# Patient Record
Sex: FEMALE | Race: ASIAN | Employment: UNEMPLOYED | URBAN - METROPOLITAN AREA
[De-identification: names, ages, dates, MRNs, and addresses within clinical notes are randomized per-mention and may not be internally consistent; named-entity substitution may affect disease eponyms.]

---

## 2017-05-09 PROBLEM — F32.2 SEVERE MAJOR DEPRESSION, SINGLE EPISODE, WITHOUT PSYCHOTIC FEATURES (HCC): Status: ACTIVE | Noted: 2017-05-09

## 2017-05-09 PROBLEM — F41.0 PANIC DISORDER WITHOUT AGORAPHOBIA: Status: ACTIVE | Noted: 2017-05-09

## 2017-12-02 ENCOUNTER — OFFICE VISIT (OUTPATIENT)
Dept: FAMILY MEDICINE CLINIC | Facility: CLINIC | Age: 22
End: 2017-12-02

## 2017-12-02 VITALS
DIASTOLIC BLOOD PRESSURE: 64 MMHG | RESPIRATION RATE: 20 BRPM | BODY MASS INDEX: 27.91 KG/M2 | SYSTOLIC BLOOD PRESSURE: 122 MMHG | HEART RATE: 87 BPM | WEIGHT: 173.63 LBS | OXYGEN SATURATION: 97 % | HEIGHT: 66 IN | TEMPERATURE: 99 F

## 2017-12-02 DIAGNOSIS — J01.40 ACUTE NON-RECURRENT PANSINUSITIS: Primary | ICD-10-CM

## 2017-12-02 PROCEDURE — 99213 OFFICE O/P EST LOW 20 MIN: CPT | Performed by: NURSE PRACTITIONER

## 2017-12-02 RX ORDER — AMOXICILLIN AND CLAVULANATE POTASSIUM 875; 125 MG/1; MG/1
1 TABLET, FILM COATED ORAL 2 TIMES DAILY
Qty: 20 TABLET | Refills: 0 | Status: SHIPPED | OUTPATIENT
Start: 2017-12-02 | End: 2017-12-12

## 2017-12-02 NOTE — PROGRESS NOTES
CHIEF COMPLAINT:   Patient presents with:  Cough: cough, darke color mucus, stuffy noses, headache x1wk      HPI:   Rashi Ibrahim is a 23year old female who presents for cold symptoms for  10  days.  Symptoms have progressed into sinus congestion and been wo HEAD: atraumatic, normocephalic, +  tenderness on palpation of maxillary sinuses  EYES: conjunctiva clear, EOM intact  EARS: TM's pearly, pos bulging, no retraction, moderate opaque fluid, bony landmarks visible  NOSE: nostrils patent, thick prulunent nasa The sinuses are air-filled spaces within the bones of the face. They connect to the inside of the nose. Sinusitis is an inflammation of the tissue lining the sinus cavity. Sinus inflammation can occur during a cold.  It can also be due to allergies to polle · Do not use nasal rinses or irrigation during an acute sinus infection, unless told to by your health care provider. Rinsing may spread the infection to other sinuses.   · Use acetaminophen or ibuprofen to control pain, unless another pain medicine was pre

## 2018-06-03 NOTE — BH LEVEL OF CARE ASSESSMENT
Level of Care Assessment Note    General Questions  Why are you here?: \"My therapist told me I need to come to keep myself safe.  I told her I was struggling with SI.\"  Precipitating Events: Pt was at therapist office and made suicidal statements stating feel now. I have so many thoughts in my head. I have a lot of self-hate kind of thoughts. Is your experience of thoughts of dying by suicide: A Coping Strategy; A Solution to a Problem (\"I feel it is what I deserve. \")  Protective Factors: friends and so self-hate thoughts. Pt stated she is very isolated and has little to no interest in anything. Anxiety Symptoms: Panic attack  Panic Attacks: Panic attacks have been on and off for years but the last one I had was one week ago.  Last year they came every d and Location: [facility]   Grade Level: [employer]   School Issues: Denies School Issues  Describe Issues: Pt did well in school this past year   Employment Status: Unemployed  Job Issues:  (n/a)  Concerns/Conflicts with Social Relationships: Yes  Describe Concerns/C Alert  Behavior  Exhibited behavior: Appropriate to situation    Assessment Summary  Assessment Summary: Pt is a 24 y/o female presenting with suicidal plan and intent to her therapist office.  Pt stated that she has felt depressed for the past few years bu T Review  Behavioral Precautions: Suicide  Medical Precautions: None

## 2018-06-03 NOTE — ED PROVIDER NOTES
Patient Seen in: [facility] AND [facility] Emergency Department    History   No chief complaint on file.     Stated Complaint: SI    HPI    22 yo F with PMH depression with history of self-cutting behavior presenting with several weeks of worsening depression with HPI.  Constitutional and vital signs reviewed. All other systems reviewed and negative except as noted above. PSFH elements reviewed from today and agreed except as otherwise stated in HPI.     Physical Exam   ED Triage Vitals [06/03/18 1542]  BP: 1 CBC W/ DIFFERENTIAL[[id number]]          Abnormal            Preliminary result           Please view results for these tests on the individual orders.    MD BLOOD SMEAR CONSULT       MDM     DIFFERENTIAL DIAGNOSIS: After history and physical exam diff

## 2018-06-03 NOTE — ED NOTES
Patient sent by therapist's office by EMS for suicidal statements. Patient arrives calm and cooperative, with indirect eye contact.  Patient states that she has been having suicidal thoughts of \"driving to [place] and jumping into [facility] whe

## 2018-06-04 PROBLEM — F33.2 MAJOR DEPRESSIVE DISORDER, RECURRENT EPISODE, SEVERE (HCC): Status: ACTIVE | Noted: 2018-06-04

## 2018-06-04 NOTE — ED NOTES
Updated the patient and her family regarding the transfer to [facility]. Hank Marcos RN.

## 2018-06-04 NOTE — ED NOTES
Report given to ELIU Plasencia at [facility]. [place] ETA 1 hour. Family updated.

## 2018-06-04 NOTE — ED NOTES
Najma Vigil has been accepted for admission to [facility] under the care of Dr Sal Mendosa. She will be admitted to room 714B. [employer]one number to call RN report is . Name of floor RN is Isaiah Chang.

## 2018-06-04 NOTE — ED NOTES
Called ADY @ [phone]. Spoke to Haley RAMIREZ. She authorized (4) days at [facility], (6/3/18 through 6/6/18). Concurrent review is due on 6/6/18.    Authorization number is [id number]

## 2019-12-31 ENCOUNTER — OFFICE VISIT (OUTPATIENT)
Dept: FAMILY MEDICINE CLINIC | Facility: CLINIC | Age: 24
End: 2019-12-31

## 2019-12-31 VITALS
DIASTOLIC BLOOD PRESSURE: 79 MMHG | WEIGHT: 191 LBS | OXYGEN SATURATION: 98 % | BODY MASS INDEX: 30.7 KG/M2 | RESPIRATION RATE: 16 BRPM | TEMPERATURE: 98 F | HEART RATE: 99 BPM | HEIGHT: 66 IN | SYSTOLIC BLOOD PRESSURE: 123 MMHG

## 2019-12-31 DIAGNOSIS — A08.4 STOMACH FLU: ICD-10-CM

## 2019-12-31 DIAGNOSIS — R11.0 NAUSEA: Primary | ICD-10-CM

## 2019-12-31 DIAGNOSIS — Z11.1 SCREENING FOR TUBERCULOSIS: ICD-10-CM

## 2019-12-31 PROCEDURE — 99213 OFFICE O/P EST LOW 20 MIN: CPT | Performed by: NURSE PRACTITIONER

## 2019-12-31 RX ORDER — ONDANSETRON 4 MG/1
4 TABLET, FILM COATED ORAL EVERY 8 HOURS PRN
Qty: 10 TABLET | Refills: 0 | Status: SHIPPED | OUTPATIENT
Start: 2019-12-31 | End: 2020-02-05

## 2019-12-31 NOTE — PATIENT INSTRUCTIONS
Viral Gastroenteritis (Adult)    Gastroenteritis is commonly called the \"stomach flu,\" although it has nothing to do with influenza. It is most often caused by a virus that affects the stomach and intestinal tract and usually lasts from 2 to 7 days.  Co You may use acetaminophen or NSAID medicines like ibuprofen or naproxen to control fever unless another medicine was given. If you have chronic liver or kidney disease, talk with your healthcare provider before using these medicines.  Also talk with your pr · Limit fat intake to less than 15 grams per day. Do this by avoiding margarine, butter, oils, mayonnaise, sauces, gravies, fried foods, peanut butter, meat, poultry, and fish.   · Limit fiber and avoid raw or cooked vegetables, fresh fruits (except bananas Your symptoms may return or get worse after eating certain foods listed below. If this happens, stop eating these foods until your symptoms ease and you feel better.   Once the vomiting stops, follow the steps below.   During the first 12 to 24 hours  Rach Please return to clinic on 1/2/20 after 2:13 pm or on 1/3/20 before 2:13 pm to have your TB test read. If you do not return during this time your test will need to be repeated.      Our clinic hours are:  Monday-Friday        8:00 am to 7:30 pm  Saturday

## 2019-12-31 NOTE — PROGRESS NOTES
CHIEF COMPLAINT:   Patient presents with:  Stomach Pain: x4 days stomache, tired,     HPI:   Isaac Smith is a 27year old female who presents for complaints of diarrhea for 4 days. Reports generalized mild cramping abdominal pain.   Symptoms are worsened CARDIOVASCULAR: denies chest pain or palpitations  RESPIRATORY: denies shortness of breath, cough or wheezing  GI:See HPI  NEURO: denies headaches, loss of bowel or bladder control    EXAM:   Ht 66\"   Wt 191 lb (86.6 kg)   LMP 12/10/2019   BMI 30.83 kg/m² Sig: Take 1 tablet (4 mg total) by mouth every 8 (eight) hours as needed for Nausea. Patient Instructions     Viral Gastroenteritis (Adult)    Gastroenteritis is commonly called the \"stomach flu,\" although it has nothing to do with influenza.  I · Keep uncooked meats away from cooked and ready-to-eat foods. Medicine  You may use acetaminophen or NSAID medicines like ibuprofen or naproxen to control fever unless another medicine was given.  If you have chronic liver or kidney disease, talk with you · Plain noodles, rice, mashed potatoes, chicken noodle or rice soup  · Unsweetened canned fruit (avoid pineapple), bananas  · Limit fat intake to less than 15 grams per day.  Do this by avoiding margarine, butter, oils, mayonnaise, sauces, gravies, fried fo © 5304-0187 [employer] [address]. [address] [facility] [place], [address] [employer] [place]. All rights reserved. This information is not intended as a substitute for professional medical care. Always follow your healthcare professional's instructions.         Diet fo © 9011-7015 [employer] [address]. [address] [facility] [place], [facility] [place]. All rights reserved. This information is not intended as a substitute for professional medical care. Always follow your healthcare professional's instructions.       You will

## 2019-12-31 NOTE — PROGRESS NOTES
Lucas Oconnell 27year old female       TUBERCULOSIS SCREENING QUESTIONNAIRE    · Live vaccines in the past month? no  · Any steroid medication in the past month? no  · History of BCG vaccine? no  · If female, are you currently pregnant?   no    · Are you

## 2020-01-03 ENCOUNTER — APPOINTMENT (OUTPATIENT)
Dept: FAMILY MEDICINE CLINIC | Facility: CLINIC | Age: 25
End: 2020-01-03

## 2020-01-03 DIAGNOSIS — Z11.1 ENCOUNTER FOR PPD SKIN TEST READING: Primary | ICD-10-CM

## 2021-07-20 PROBLEM — T50.902A INTENTIONAL DRUG OVERDOSE, INITIAL ENCOUNTER (HCC): Status: ACTIVE | Noted: 2021-07-20

## 2021-07-20 PROBLEM — T50.902A INTENTIONAL DRUG OVERDOSE (HCC): Status: ACTIVE | Noted: 2021-07-20

## 2021-07-20 NOTE — ED INITIAL ASSESSMENT (HPI)
Pt to ED via EMS s/p suicide attempt. Pt stopped taking her medications 3 days ago and today purposely took seven pills Duloxetine 40mg and seven pills Bupropion 300mg.

## 2021-07-20 NOTE — ED QUICK NOTES
Patient remains alert at this time. Parents at bedside. Patient able to maintain airway and willing/able to drink charcoal solution as recommended by poison control. Problem: Patient Care Overview  Goal: Plan of Care Review  Outcome: Ongoing (interventions implemented as appropriate)  Patient is AAOx4. Pt is calm and cooperative. Teaching and education performed. Patient remains free from falls and injury this shift. Bed in low, locked position, environment is free of clutter, with call bell in reach. Patient encouraged to call for assistance. Patient verbalized understanding. All belongings within reach. High protein / High calorie diet. Max HR of 120 over night. PICC line still able to provide adequate blood return. No N/V throughout shift. Afebrile on vancomycin and cefe. Pt C/O muscular pain to lower extremities. Administered oxy IR x2 throughout shift. Will continue to monitor.

## 2021-07-20 NOTE — ED PROVIDER NOTES
Patient Seen in: [facility] Emergency Department      History   Patient presents with:  Eval-P    Stated Complaint: SI     HPI/Subjective:   HPI    This is a 82-year-old woman history of depression, previous suicide attempts, here for evaluation med wheezing, rhonchi or rales.    Abdominal: Soft nontender nondistended, normal bowel sounds, no guarding no rebound tenderness  Skin: Warm and dry  Neurological: Awake alert, speech is normal  Psych: Denies SI denies HI        ED Course     Labs Reviewed   C MCHC 28.2 (*)     RDW 19.1 (*)     Monocyte Absolute 1.07 (*)     All other components within normal limits   ETHYL ALCOHOL - Normal   MAGNESIUM - Normal   HCG, BETA SUBUNIT, QUAL - Normal   CBC WITH DIFFERENTIAL WITH PLATELET    Narrative:      The followi have recommended inpatient observation for 24 hours on telemetry. Patient has no other complaints at this time.   Admission disposition: 7/20/2021  1:48 PM                                  Disposition and Plan     Clinical Impression:  Intentional drug ove

## 2021-07-20 NOTE — ED QUICK NOTES
Report given to Chayo Hurley RN at this time. Repeat EKG order placed for 1700 per poison control instructions. Will follow up as needed. Mother and sister of patient at bedside at this time.

## 2021-07-21 PROBLEM — F33.2 MDD (MAJOR DEPRESSIVE DISORDER), RECURRENT SEVERE, WITHOUT PSYCHOSIS (HCC): Status: ACTIVE | Noted: 2021-07-21

## 2021-07-21 NOTE — PROGRESS NOTES
[facility]  Progress Note     Galina Paty Patient Status:  Observation    1995 MRN [id number]   [facility] 3NE-A Attending Samson Muniz MD   Hosp Day # 0 PCP None Pcp     Chief Complaint: drug overdose    S: Patient doing ok hours.    Inflammatory Markers  No results for input(s): CRP, ANA, LDH, DDIMER in the last 168 hours. Imaging: Imaging data reviewed in Epic.     Medications:   • ondansetron HCl  4 mg Intravenous Once   • enoxaparin  40 mg Subcutaneous Daily       ASSES

## 2021-07-21 NOTE — H&P
[facility]  History and Physical     Jo-Ann Rodriguez Patient Status:  Observation    1995 MRN [id number]   [facility] 3NE-A Attending Dolores Patten DO   Hosp Day # 0 PCP None Pcp     Chief Complaint: drug overdose    History of 33.57 kg/m²   General: No acute distress. Alert and oriented x 3. HEENT: Normocephalic atraumatic. Moist mucous membranes. EOM-I. PERRLA. Anicteric. Neck: No lymphadenopathy. No JVD. No carotid bruits. Respiratory: Clear to auscultation bilaterally.  No for ecstasy  4. Chronic microcytic anemia  1. Iron studies > iron supplementation if low  5.  Hyperglycemia    Quality:  · DVT Prophylaxis: lovenox  · CODE status: FULL  · Farrell: no  · If COVID testing is negative, may discontinue isolation: yes     Plan of
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
## 2021-07-21 NOTE — PLAN OF CARE
Patient admitted for intentional OD. A&O x4, very quiet and flat affect. No C/O pain. Lungs clear. Mother stayed over night with patient. Patient tearful at times, embarrassed by what she did.   Awaiting to be medically cleared to get a bed cleared at

## 2021-07-21 NOTE — CERTIFICATION
Ref: [facility] 5/3-403, 5/3-602, 5/3-607, 5/3-610    5/3-702, 5/3-813, 5/4-306, 5/4-402, 5/4-403    5/4-405, 5/4-501, 5/4-611, 5/3-906   Inpatient Certificate  Re: Jl Roman    (name)     I personally informed the above-named individual of the purpose behavioral history, to suffer mental or emotional deterioration and is reasonably expected, after such deterioration, to meet the criteria of either paragraph one or paragraph two above;   []  An individual who is developmentally disabled and unless treate

## 2021-07-21 NOTE — PLAN OF CARE
Assumed pt care at 0730. A&Ox4. VSS. Room air. NSR/ST on tele. Suicide precautions in place, 1:1 sitter at bedside. Pt denies active thoughts of SI this AM. Denies pain, denies N/V. L hand PIV SL. Regular diet. Lovenox subcutaneous for VTE prevention.  Void

## 2021-07-21 NOTE — PROGRESS NOTES
Talked to patient and her father in the room also, patient and her father if patient can just stay at [facility], explained to them that Dr. Karla Crawley wants patient to be transfer to [facility] when she is medically clear.  Certificate and petition done o

## 2021-07-22 NOTE — PROGRESS NOTES
NURSING DISCHARGE NOTE    Discharged Other, (see nursing note) via Ambulance. Accompanied by Support staff and ambulance stafff   Belongings Returned to patient from safe. Pt dced to cristian. P&C in chart  Family at bedside and aware of plan. IV dced.

## 2021-07-22 NOTE — PLAN OF CARE
Pt medically cleared and transferred to [facility].   Problem: Risk for Violence-Violent Restraints/Seclusion  Goal: Patient will not express any violent or self-destructive behaviors  Description: Interventions:  - Apply the least restrictive restraint to prevent

## 2021-07-24 NOTE — DISCHARGE SUMMARY
[facility]  DISCHARGE SUMMARY     Lucas Oconnell Patient Status:  Observation    1995 MRN [id number]   [facility] 3NE-A Attending No att. providers found   Hosp Day # 0 PCP None Pcp     Date of Admission: 2021  Date of Disch these medications    buPROPion HCl ER (XL) 300 MG Tb24  Commonly known as: Wellbutrin XL        DULoxetine HCl 40 MG Cpep               [place]    Follow-up appointment:   No follow-up provider specified.   Appointments for Next 30 Days 7/24/202

## 2021-07-28 PROBLEM — F33.2 MDD (MAJOR DEPRESSIVE DISORDER), RECURRENT SEVERE, WITHOUT PSYCHOSIS (HCC): Status: RESOLVED | Noted: 2021-07-21 | Resolved: 2021-07-28

## 2021-07-28 PROBLEM — F33.2 MAJOR DEPRESSIVE DISORDER, RECURRENT EPISODE, SEVERE (HCC): Status: RESOLVED | Noted: 2018-06-04 | Resolved: 2021-07-28

## 2021-07-28 PROBLEM — F32.2 SEVERE MAJOR DEPRESSION, SINGLE EPISODE, WITHOUT PSYCHOTIC FEATURES (HCC): Status: RESOLVED | Noted: 2017-05-09 | Resolved: 2021-07-28

## 2021-07-28 PROBLEM — F41.0 PANIC DISORDER WITHOUT AGORAPHOBIA: Status: RESOLVED | Noted: 2017-05-09 | Resolved: 2021-07-28

## 2021-07-31 ENCOUNTER — OFFICE VISIT (OUTPATIENT)
Dept: FAMILY MEDICINE CLINIC | Facility: CLINIC | Age: 26
End: 2021-07-31

## 2021-07-31 VITALS
SYSTOLIC BLOOD PRESSURE: 112 MMHG | HEART RATE: 106 BPM | TEMPERATURE: 99 F | RESPIRATION RATE: 20 BRPM | WEIGHT: 210.38 LBS | DIASTOLIC BLOOD PRESSURE: 82 MMHG | HEIGHT: 67 IN | BODY MASS INDEX: 33.02 KG/M2 | OXYGEN SATURATION: 100 %

## 2021-07-31 DIAGNOSIS — Z02.1 PHYSICAL EXAM, PRE-EMPLOYMENT: Primary | ICD-10-CM

## 2021-07-31 PROCEDURE — 3008F BODY MASS INDEX DOCD: CPT | Performed by: FAMILY MEDICINE

## 2021-07-31 PROCEDURE — 3079F DIAST BP 80-89 MM HG: CPT | Performed by: FAMILY MEDICINE

## 2021-07-31 PROCEDURE — 3074F SYST BP LT 130 MM HG: CPT | Performed by: FAMILY MEDICINE

## 2021-07-31 PROCEDURE — 99395 PREV VISIT EST AGE 18-39: CPT | Performed by: FAMILY MEDICINE

## 2021-07-31 NOTE — PROGRESS NOTES
CHIEF COMPLAINT:   Patient presents with:  Employment Physical      HPI:   Justice Hare is a 23year old female who presents for a complete physical exam.  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    First job out of [place] [employer]   Temp 99 °F (37.2 °C) (Tympanic)   Resp 20   Ht 5' 7\" (1.702 m)   Wt 210 lb 6.4 oz (95.4 kg)   LMP 07/07/2021 (Approximate)   SpO2 100%   BMI 32.95 kg/m²   Body mass index is 32.95 kg/m².      GENERAL: well developed, well nourished,in no appa

## 2022-07-25 ENCOUNTER — HOSPITAL ENCOUNTER (OUTPATIENT)
Age: 27
Discharge: HOME | End: 2022-07-25
Attending: EMERGENCY MEDICINE
Payer: COMMERCIAL

## 2022-07-25 VITALS
SYSTOLIC BLOOD PRESSURE: 130 MMHG | DIASTOLIC BLOOD PRESSURE: 80 MMHG | OXYGEN SATURATION: 99 % | HEART RATE: 72 BPM | RESPIRATION RATE: 18 BRPM | TEMPERATURE: 98 F

## 2022-07-25 DIAGNOSIS — Z13.9 ENCOUNTER FOR MEDICAL SCREENING EXAMINATION: Primary | ICD-10-CM

## 2022-07-25 LAB — SARS-COV-2 RNA RESP QL NAA+PROBE: NOT DETECTED

## 2022-07-25 PROCEDURE — 99213 OFFICE O/P EST LOW 20 MIN: CPT

## 2022-07-25 PROCEDURE — 99212 OFFICE O/P EST SF 10 MIN: CPT

## 2022-10-12 ENCOUNTER — OFFICE VISIT (OUTPATIENT)
Dept: FAMILY MEDICINE CLINIC | Facility: CLINIC | Age: 27
End: 2022-10-12
Payer: COMMERCIAL

## 2022-10-12 VITALS
TEMPERATURE: 99 F | SYSTOLIC BLOOD PRESSURE: 122 MMHG | BODY MASS INDEX: 25.98 KG/M2 | WEIGHT: 157.81 LBS | RESPIRATION RATE: 18 BRPM | DIASTOLIC BLOOD PRESSURE: 86 MMHG | OXYGEN SATURATION: 99 % | HEIGHT: 65.35 IN | HEART RATE: 96 BPM

## 2022-10-12 DIAGNOSIS — J01.00 ACUTE NON-RECURRENT MAXILLARY SINUSITIS: ICD-10-CM

## 2022-10-12 DIAGNOSIS — H10.31 ACUTE BACTERIAL CONJUNCTIVITIS OF RIGHT EYE: Primary | ICD-10-CM

## 2022-10-12 PROCEDURE — 3079F DIAST BP 80-89 MM HG: CPT | Performed by: NURSE PRACTITIONER

## 2022-10-12 PROCEDURE — 3008F BODY MASS INDEX DOCD: CPT | Performed by: NURSE PRACTITIONER

## 2022-10-12 PROCEDURE — 3074F SYST BP LT 130 MM HG: CPT | Performed by: NURSE PRACTITIONER

## 2022-10-12 PROCEDURE — 99213 OFFICE O/P EST LOW 20 MIN: CPT | Performed by: NURSE PRACTITIONER

## 2022-10-12 RX ORDER — AMOXICILLIN AND CLAVULANATE POTASSIUM 875; 125 MG/1; MG/1
1 TABLET, FILM COATED ORAL 2 TIMES DAILY
Qty: 20 TABLET | Refills: 0 | Status: SHIPPED | OUTPATIENT
Start: 2022-10-12 | End: 2022-10-22

## 2022-10-12 RX ORDER — OFLOXACIN 3 MG/ML
2 SOLUTION/ DROPS OPHTHALMIC 4 TIMES DAILY
Qty: 1 EACH | Refills: 0 | Status: SHIPPED | OUTPATIENT
Start: 2022-10-12 | End: 2022-10-19

## 2022-11-20 ENCOUNTER — APPOINTMENT (OUTPATIENT)
Dept: GENERAL RADIOLOGY | Age: 27
End: 2022-11-20
Attending: STUDENT IN AN ORGANIZED HEALTH CARE EDUCATION/TRAINING PROGRAM
Payer: COMMERCIAL

## 2022-11-20 ENCOUNTER — HOSPITAL ENCOUNTER (OUTPATIENT)
Age: 27
Discharge: HOME | End: 2022-11-20
Attending: STUDENT IN AN ORGANIZED HEALTH CARE EDUCATION/TRAINING PROGRAM
Payer: COMMERCIAL

## 2022-11-20 VITALS
WEIGHT: 150 LBS | OXYGEN SATURATION: 98 % | DIASTOLIC BLOOD PRESSURE: 76 MMHG | TEMPERATURE: 100 F | SYSTOLIC BLOOD PRESSURE: 133 MMHG | HEIGHT: 66 IN | RESPIRATION RATE: 20 BRPM | BODY MASS INDEX: 24.11 KG/M2 | HEART RATE: 107 BPM

## 2022-11-20 DIAGNOSIS — J10.1 INFLUENZA A: Primary | ICD-10-CM

## 2022-11-20 LAB
POCT INFLUENZA A: POSITIVE
POCT INFLUENZA B: NEGATIVE
SARS-COV-2 RNA RESP QL NAA+PROBE: NOT DETECTED

## 2022-11-20 PROCEDURE — 71046 X-RAY EXAM CHEST 2 VIEWS: CPT | Performed by: STUDENT IN AN ORGANIZED HEALTH CARE EDUCATION/TRAINING PROGRAM

## 2022-11-20 PROCEDURE — 99213 OFFICE O/P EST LOW 20 MIN: CPT

## 2022-11-20 PROCEDURE — 87502 INFLUENZA DNA AMP PROBE: CPT | Performed by: STUDENT IN AN ORGANIZED HEALTH CARE EDUCATION/TRAINING PROGRAM

## 2022-11-20 PROCEDURE — 99214 OFFICE O/P EST MOD 30 MIN: CPT

## 2022-11-20 NOTE — ED INITIAL ASSESSMENT (HPI)
Pt has had a cough for 1 month that has not gone away, pt states that this past week thing have gotten worse with sore throat, cough  Congestion

## 2024-10-24 ENCOUNTER — OFFICE VISIT (OUTPATIENT)
Dept: FAMILY MEDICINE CLINIC | Facility: CLINIC | Age: 29
End: 2024-10-24
Payer: COMMERCIAL

## 2024-10-24 VITALS
SYSTOLIC BLOOD PRESSURE: 110 MMHG | RESPIRATION RATE: 16 BRPM | TEMPERATURE: 98 F | WEIGHT: 132.25 LBS | DIASTOLIC BLOOD PRESSURE: 70 MMHG | OXYGEN SATURATION: 100 % | BODY MASS INDEX: 21.25 KG/M2 | HEART RATE: 86 BPM | HEIGHT: 66 IN

## 2024-10-24 DIAGNOSIS — L50.9 URTICARIA: ICD-10-CM

## 2024-10-24 DIAGNOSIS — Z13.220 SCREENING CHOLESTEROL LEVEL: ICD-10-CM

## 2024-10-24 DIAGNOSIS — Z00.00 WELL ADULT EXAM: Primary | ICD-10-CM

## 2024-10-24 DIAGNOSIS — D50.0 IRON DEFICIENCY ANEMIA DUE TO CHRONIC BLOOD LOSS: ICD-10-CM

## 2024-10-24 DIAGNOSIS — Z23 NEED FOR VACCINATION: ICD-10-CM

## 2024-10-24 DIAGNOSIS — F32.A DEPRESSION, UNSPECIFIED DEPRESSION TYPE: ICD-10-CM

## 2024-10-24 PROCEDURE — 90472 IMMUNIZATION ADMIN EACH ADD: CPT | Performed by: STUDENT IN AN ORGANIZED HEALTH CARE EDUCATION/TRAINING PROGRAM

## 2024-10-24 PROCEDURE — 90715 TDAP VACCINE 7 YRS/> IM: CPT | Performed by: STUDENT IN AN ORGANIZED HEALTH CARE EDUCATION/TRAINING PROGRAM

## 2024-10-24 PROCEDURE — 90471 IMMUNIZATION ADMIN: CPT | Performed by: STUDENT IN AN ORGANIZED HEALTH CARE EDUCATION/TRAINING PROGRAM

## 2024-10-24 PROCEDURE — 90656 IIV3 VACC NO PRSV 0.5 ML IM: CPT | Performed by: STUDENT IN AN ORGANIZED HEALTH CARE EDUCATION/TRAINING PROGRAM

## 2024-10-24 PROCEDURE — 99395 PREV VISIT EST AGE 18-39: CPT | Performed by: STUDENT IN AN ORGANIZED HEALTH CARE EDUCATION/TRAINING PROGRAM

## 2024-10-24 NOTE — PROGRESS NOTES
Subjective:      Chief Complaint   Patient presents with    Establish Care    Physical     Has not had papsmear done before    Hives     X 6 weeks      HISTORY OF PRESENT ILLNESS  HPI  HPI obtained per patient report.  Socorro Narvaez is a pleasant 28 year old female presenting for an annual physical.   She reports that she has been experiencing hives in different random areas of her body intermittently for 6 weeks. She denies any new cosmetic or household products, foods, or medications that she may be reacting to. She recently started a new job but denies any other symptoms of possible allergies including but not limited to rhinorrhea, congestion, cough, nausea, abdominal cramping, changes in stool. She denies prior history of similar symptoms. She has been taking an OTC antihistamine which has been helping.   Her insurance recently changed, so she requests a referral to continue care with her psychiatrist.    PAST PATIENT HISTORY  Past Medical History:    Depression    History of self-harm    Iron deficiency anemia    Suicidal behavior     History reviewed. No pertinent surgical history.    CURRENT MEDICATIONS  Medications Taking[1]    HEALTH MAINTENANCE  Immunization History   Administered Date(s) Administered    Covid-19 Vaccine Pfizer 30 mcg/0.3 ml 02/10/2021, 03/09/2021, 10/22/2021    DTAP 01/31/1998, 03/28/1998, 05/30/1998, 06/12/1999, 08/06/2004    FLU VAC QIV SPLIT 3 YRS AND OLDER (93783) 09/12/2019    FLUZONE 6 months and older PFS 0.5 ml (85650) 10/27/2016, 10/16/2018, 09/07/2020    HEP A,Ped/Adol,(2 Dose) 04/03/2008, 10/25/2008    Hep B, Unspecified Formulation 12/30/1997, 01/31/1998, 08/01/1998    Hib, Unspecified Formulation 01/31/1998, 03/28/1998, 05/30/1998, 03/27/1999    IPV 01/31/1998, 03/28/1998, 05/30/1998, 06/12/1999, 10/16/2003    Influenza(Afluria)0.5ml QIV PFS 10/17/2018, 09/12/2019    MMR 11/28/1998, 08/06/2004    Meningococcal Vaccine 02/24/2010    Meningococcal-Menactra 06/16/2015, 04/29/2023     Pfizer Covid-19 Vaccine 30mcg/0.3ml 12yrs+ 12/30/2023    Pneumovax 23 06/16/2001    TDAP 06/19/2009    Tb Intradermal Test 12/31/2019, 01/11/2021    Varicella 11/28/1998, 06/16/2001   Pended Date(s) Pended    FLULAVAL 6 months & older 0.5 ml Prefilled syringe (89367) 10/24/2024    TDAP 10/24/2024       ALLERGIES AND DRUG REACTIONS  Allergies[2]    History reviewed. No pertinent family history.  Social History     Socioeconomic History    Marital status: Single   Tobacco Use    Smoking status: Never    Smokeless tobacco: Never   Vaping Use    Vaping status: Never Used   Substance and Sexual Activity    Alcohol use: No    Drug use: No       Review of Systems   Skin:  Positive for rash.   All other systems reviewed and are negative.         Objective:      /70   Pulse 86   Temp 97.5 °F (36.4 °C) (Temporal)   Resp 16   Ht 5' 6\" (1.676 m)   Wt 132 lb 4 oz (60 kg)   LMP 10/24/2024 (Exact Date)   SpO2 100%   BMI 21.35 kg/m²   Body mass index is 21.35 kg/m².    Physical Exam  Vitals reviewed.   Constitutional:       General: She is not in acute distress.     Appearance: She is normal weight. She is not ill-appearing, toxic-appearing or diaphoretic.   HENT:      Head: Normocephalic and atraumatic.   Eyes:      General: No scleral icterus.        Right eye: No discharge.         Left eye: No discharge.      Extraocular Movements: Extraocular movements intact.      Conjunctiva/sclera: Conjunctivae normal.   Neck:      Thyroid: No thyroid mass, thyromegaly or thyroid tenderness.   Cardiovascular:      Rate and Rhythm: Normal rate and regular rhythm.      Pulses: Normal pulses.      Heart sounds: Normal heart sounds.   Pulmonary:      Effort: Pulmonary effort is normal.   Abdominal:      General: Abdomen is flat. Bowel sounds are normal. There is no distension.      Palpations: Abdomen is soft. There is no mass.      Tenderness: There is no abdominal tenderness. There is no guarding or rebound.      Hernia: No  hernia is present.   Musculoskeletal:      Cervical back: Neck supple. No tenderness.      Right lower leg: No edema.      Left lower leg: No edema.   Lymphadenopathy:      Cervical: No cervical adenopathy.   Skin:     General: Skin is warm and dry.      Coloration: Skin is not jaundiced or pale.      Findings: No bruising, erythema or rash.   Neurological:      Mental Status: She is alert and oriented to person, place, and time.   Psychiatric:         Mood and Affect: Mood normal.            Assessment and Plan:      1. Well adult exam (Primary)  -     CBC With Differential With Platelet; Future; Expected date: 10/24/2024  -     Comp Metabolic Panel (14); Future; Expected date: 10/24/2024  2. Screening cholesterol level  -     Lipid Panel; Future; Expected date: 10/24/2024  3. Need for vaccination  -     TdaP (Boostrix) Vaccine (> 7 Y)  -     Flulaval 6 months and older, Quadrivalent, Preservative Free [15135]  4. Urticaria  -     CBC With Differential With Platelet; Future; Expected date: 10/24/2024  -     Comp Metabolic Panel (14); Future; Expected date: 10/24/2024  -     TSH W Reflex To Free T4; Future; Expected date: 10/24/2024  -     Iron And Tibc; Future; Expected date: 10/24/2024  -     Ferritin; Future; Expected date: 10/24/2024  5. Iron deficiency anemia due to chronic blood loss  -     CBC With Differential With Platelet; Future; Expected date: 10/24/2024  -     Iron And Tibc; Future; Expected date: 10/24/2024  -     Ferritin; Future; Expected date: 10/24/2024  6. Depression, unspecified depression type  -     [facility]    Return in about 1 year (around 10/24/2025) for physical.    - lab orders were discussed and provided for her today. Further recommendations for evaluation and treatment of her urticaria will be pending her lab results. Recommended continuation of her OTC antihistamine in the interim  - recommended a screening pap smear, as she has never had a pap in the past.  She preferred to defer this screening today  - Tdap booster and influenza vaccine were administered with her consent today. She declined Gardasil today      Patient verbalized understanding of assessment and recommendations. All questions and concerns were addressed.    Electronically signed by Irma Yost MD         [1]   Outpatient Medications Marked as Taking for the 10/24/24 encounter (Office Visit) with Irma Yost MD   Medication Sig Dispense Refill    amphetamine-dextroamphetamine (ADDERALL) 20 MG Oral Tab Take 1 tablet (20 mg total) by mouth 2 (two) times daily at 8am and at noon 60 tablet 0    buPROPion  MG Oral Tablet 24 Hr Take 1 tablet (300 mg total) by mouth daily. 90 tablet 0    hydrOXYzine 25 MG Oral Tab Take 1 tablet (25 mg total) by mouth 3 (three) times daily as needed for Anxiety. 90 tablet 2    naltrexone 50 MG Oral Tab Take 1 tablet (50 mg total) by mouth daily. 90 tablet 0    ferrous sulfate 325 (65 FE) MG Oral Tab EC Take 1 tablet (325 mg total) by mouth daily with breakfast. 30 tablet 0   [2] No Known Allergies

## 2024-10-27 ENCOUNTER — TELEPHONE (OUTPATIENT)
Age: 29
End: 2024-10-27

## 2024-10-27 NOTE — TELEPHONE ENCOUNTER
Hello,     The [facility] Navigation team has attempted to reach you regarding an order from Dr. Yost's office. We are reaching out in order to assist you in coordinating care and resources that may meet your needs. Please give our office a call at [phone]. For more immediate assistance you can contact our 24-hour help line at [phone]. We look forward to hearing from you soon.

## 2024-10-30 ENCOUNTER — HOSPITAL ENCOUNTER (OUTPATIENT)
Age: 29
Discharge: HOME | End: 2024-10-30
Payer: COMMERCIAL

## 2024-10-30 VITALS
TEMPERATURE: 98 F | WEIGHT: 130 LBS | HEART RATE: 108 BPM | HEIGHT: 66 IN | SYSTOLIC BLOOD PRESSURE: 120 MMHG | OXYGEN SATURATION: 98 % | DIASTOLIC BLOOD PRESSURE: 84 MMHG | BODY MASS INDEX: 20.89 KG/M2 | RESPIRATION RATE: 16 BRPM

## 2024-10-30 DIAGNOSIS — B34.9 VIRAL SYNDROME: Primary | ICD-10-CM

## 2024-10-30 DIAGNOSIS — J04.0 ACUTE LARYNGITIS: ICD-10-CM

## 2024-10-30 LAB — S PYO AG THROAT QL IA.RAPID: NEGATIVE

## 2024-10-30 PROCEDURE — 99213 OFFICE O/P EST LOW 20 MIN: CPT

## 2024-10-30 PROCEDURE — 87651 STREP A DNA AMP PROBE: CPT | Performed by: NURSE PRACTITIONER

## 2024-10-30 RX ORDER — DEXAMETHASONE 4 MG/1
12 TABLET ORAL ONCE
Status: COMPLETED | OUTPATIENT
Start: 2024-10-30 | End: 2024-10-30

## 2024-10-30 NOTE — ED PROVIDER NOTES
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  Denies any fever.  Concerned with strep.    Objective:     Past Medical History:    Depression    History of self-harm    Iron deficiency anemia    Suicidal behavior              History reviewed. No pertinent surgical history.             Social History     Socioeconomic History    Marital status: Single   Tobacco Use    Smoking status: Never     Passive exposure: Never    Smokeless tobacco: Never   Vaping Use    Vaping status: Never Used   Substance and Sexual Activity    Alcohol use: No    Drug use: No              Review of Systems   All other systems reviewed and are negative.      Positive for stated complaint: cold symptoms  Other systems are as noted in HPI.  Constitutional and vital signs reviewed.      All other systems reviewed and negative except as noted above.    Physical Exam     ED Triage Vitals [10/30/24 1328]   /84   Pulse 108   Resp 16   Temp 98.2 °F (36.8 °C)   Temp src Oral   SpO2 98 %   O2 Device        Current Vitals:   Vital Signs  BP: 120/84  Pulse: 108  Resp: 16  Temp: 98.2 °F (36.8 °C)  Temp src: Oral    Oxygen Therapy  SpO2: 98 %        Physical Exam  Vitals and nursing note reviewed.   Constitutional:       General: She is not in acute distress.     Appearance: She is well-developed. She is not ill-appearing or toxic-appearing.   HENT:      Right Ear: Tympanic membrane, ear canal and external ear normal.      Left Ear: Tympanic membrane, ear canal and external ear normal.      Nose: No congestion or rhinorrhea.      Mouth/Throat:      Pharynx: No oropharyngeal exudate or posterior oropharyngeal erythema.      Comments: Hoarse voice  Cardiovascular:      Rate and Rhythm: Normal rate  and regular rhythm.      Heart sounds: Normal heart sounds.   Pulmonary:      Effort: Pulmonary effort is normal.      Breath sounds: Normal breath sounds.   Skin:     General: Skin is warm and dry.   Neurological:      Mental Status: She is alert and oriented to person, place, and time.             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  Denies any fever.  Concerned with strep.    Pertinent Labs & Imaging studies reviewed. (See chart for details).  Patient coming in with viral symptoms.   Differential diagnosis includes but not limited to virus, COVID, flu, strep  Labs reviewed strep negative.   Will treat for virus, laryngitis.  Will discharge on Decadron given with Tylenol/Motrin for home. Patient/Parent is comfortable with this plan.    Overall Pt looks good. Non-toxic, well-hydrated and in no respiratory distress. Vital signs are reassuring. Exam is reassuring. I do not believe pt requires and additional diagnostic studies or intervention. I believe pt can be discharged home to continue evaluation as an outpatient. Follow-up provider given. Discharge instructions given and reviewed. Return for any problems. All understand and agree with the plan.        Problems Addressed:  Acute laryngitis: acute illness or injury  Viral syndrome: acute illness or injury        Disposition and Plan     Clinical Impression:  1. Viral syndrome    2. Acute laryngitis         Disposition:  Discharge  10/30/2024  1:55 pm    Follow-up:  No follow-up provider specified.        Medications Prescribed:  Current Discharge Medication List              Supplementary Documentation:

## 2024-11-11 ENCOUNTER — TELEPHONE (OUTPATIENT)
Age: 29
End: 2024-11-11

## 2024-11-11 NOTE — TELEPHONE ENCOUNTER
Hello,     The [facility] Navigation team has attempted to reach you regarding an order from Dr. Yost's office. We are reaching out in order to assist you in coordinating care and resources that may meet your needs. Please give our office a call at [phone]. For more immediate assistance you can contact our 24-hour help line at [phone]. We look forward to hearing from you soon.

## 2024-12-30 ENCOUNTER — OFFICE VISIT (OUTPATIENT)
Dept: FAMILY MEDICINE CLINIC | Facility: CLINIC | Age: 29
End: 2024-12-30

## 2024-12-30 DIAGNOSIS — Z11.1 SCREENING FOR TUBERCULOSIS: Primary | ICD-10-CM

## 2025-01-02 ENCOUNTER — OFFICE VISIT (OUTPATIENT)
Dept: FAMILY MEDICINE CLINIC | Facility: CLINIC | Age: 30
End: 2025-01-02
Payer: MEDICAID

## 2025-01-02 DIAGNOSIS — Z11.1 ENCOUNTER FOR PPD SKIN TEST READING: Primary | ICD-10-CM

## 2025-01-02 LAB — INDURATION (): 0 MM (ref 0–11)

## (undated) NOTE — LETTER
January 3, 2020    [address] [place] [id number]      Dear Eliecer Tinajero: The following are the results of your recent tests. Please review the list of test results.   Your result is the value on the left; we have also supplied the ran

## (undated) NOTE — LETTER
12/30/24  You will need to return to clinic in 48-72 hours to have results of TB test read.     Please return to clinic on 1/1 after 12:14 PM or on 1/2 before 12:14 PM to have your TB test read.    If you do not return during this time your test will need to be repeated.     Our clinic hours are:  Monday-Friday        8:00 am to 7:30 pm  Saturday/Sunday    9:00 am to 4:30 pm

## (undated) NOTE — IP AVS SNAPSHOT
Patient Demographics     Address  [address] [place] [address] Phone  [phone] [facility])  [phone] (Mobile) E-mail Address  [email or web address]      Emergency Contact(s)     Name Relation Home Work Khadra 50 Mother [phone] provider: Mo Roberts DO  07/20/21 2300 Resulting lab: [facility])    Specimen Information    Type Source Collected On   Blood — 07/21/21 0688          Components    Component Value Reference Range Flag Lab   Gluc Final result Updated: 07/21/21 1944    Specimen: Other from [facility]      Rapid SARS-CoV-2 by PCR Not Detected         H&P - H&P Note      H&P signed by Bibiana Henley DO at 7/20/2021 11:35 PM  Version 1 of 1    Author: Bibiana Henley DO Service prior to encounter. DULoxetine HCl 40 MG Oral Cap DR Particles, Take 40 mg by mouth daily. , Disp: , Rfl:   buPROPion HCl ER, XL, (WELLBUTRIN XL) 300 MG Oral Tablet 24 Hr, Take 1 tablet (300 mg total) by mouth every morning., Disp: 30 tablet, Rfl: 2[AK. 2] No results for input(s): TROP, PBNP in the last 168 hours. [AK.2]    Creatinine Kinase[AK. 1]  No results for input(s): CK in the last 168 hours. [AK.2]    Inflammatory Markers[AK. 1]  No results for input(s): CRP, ANA, LDH, DDIMER in the last 168 hours. [AK.2 at times she appears to be minimizing  Homicidal ideation: None noted    History of Present Illness  Nisha Carrillo is a 23year old female who was admitted on 7/20/2021.      Prior to the evaluation, the patient was informed of the purpose of the evaluation, his/he withdrawn ever since. He apparently did not go to the same mass during the Latter day holiday which then increasingly upset her mom.   Patient noted that this further escalated when the mom blamed patient for what was happening (when it was with my sister a lb 1.6 oz)   SpO2 100%   BMI 33.59 kg/m²     MSE:   Patient is alert and oriented x4. Patient is cooperative and consistent. Patient speech is quiet. Patient with mild psychomotor retardation. Patient is dysphoric and anxious with consistent affect.   P hydroxide, bisacodyl, Fleet Enema    Precautions:  sitter    Labs and Imaging:  Orders Placed This Encounter      CBC With Differential With Platelet          Standing Status: Standing          Number of Occurrences: 1      Comp Metabolic Panel (14) Notes (last 72 hours) (Notes from 7/18/2021  8:32 PM through 7/21/2021  8:32 PM)    No notes of this type exist for this encounter.      Occupational Therapy Notes (last 72 hours) (Notes from 7/18/2021  8:32 PM through 7/21/2021  8:32 PM)    No notes of thi